# Patient Record
Sex: MALE | Race: WHITE | NOT HISPANIC OR LATINO | Employment: FULL TIME | URBAN - METROPOLITAN AREA
[De-identification: names, ages, dates, MRNs, and addresses within clinical notes are randomized per-mention and may not be internally consistent; named-entity substitution may affect disease eponyms.]

---

## 2020-06-19 ENCOUNTER — RECORDS - HEALTHEAST (OUTPATIENT)
Dept: LAB | Facility: CLINIC | Age: 35
End: 2020-06-19

## 2020-06-22 LAB — HBV SURFACE AB SERPL IA-ACNC: POSITIVE M[IU]/ML

## 2020-06-23 LAB
MEV IGG SER IA-ACNC: POSITIVE
MUV IGG SER QL IA: POSITIVE
RUBV IGG SERPL QL IA: POSITIVE
VZV IGG SER QL IA: POSITIVE

## 2020-06-24 LAB
GAMMA INTERFERON BACKGROUND BLD IA-ACNC: 0.04 IU/ML
M TB IFN-G BLD-IMP: NEGATIVE
MITOGEN IGNF BCKGRD COR BLD-ACNC: -0.01 IU/ML
MITOGEN IGNF BCKGRD COR BLD-ACNC: 0.01 IU/ML
QTF INTERPRETATION: NORMAL
QTF MITOGEN - NIL: 8.75 IU/ML

## 2021-10-28 ENCOUNTER — HOSPITAL ENCOUNTER (EMERGENCY)
Facility: CLINIC | Age: 36
Discharge: HOME | End: 2021-10-28
Attending: EMERGENCY MEDICINE | Admitting: EMERGENCY MEDICINE
Payer: COMMERCIAL

## 2021-10-28 VITALS
WEIGHT: 190 LBS | SYSTOLIC BLOOD PRESSURE: 138 MMHG | HEIGHT: 72 IN | DIASTOLIC BLOOD PRESSURE: 65 MMHG | OXYGEN SATURATION: 98 % | HEART RATE: 81 BPM | RESPIRATION RATE: 22 BRPM | BODY MASS INDEX: 25.73 KG/M2 | TEMPERATURE: 98.8 F

## 2021-10-28 DIAGNOSIS — F10.930 ALCOHOL WITHDRAWAL SYNDROME WITHOUT COMPLICATION (H): ICD-10-CM

## 2021-10-28 LAB
ALBUMIN SERPL-MCNC: 4.7 G/DL (ref 3.5–5)
ALP SERPL-CCNC: 92 U/L (ref 45–120)
ALT SERPL W P-5'-P-CCNC: 26 U/L (ref 0–45)
ANION GAP SERPL CALCULATED.3IONS-SCNC: 24 MMOL/L (ref 5–18)
AST SERPL W P-5'-P-CCNC: 26 U/L (ref 0–40)
ATRIAL RATE - MUSE: 70 BPM
BASOPHILS # BLD AUTO: 0 10E3/UL (ref 0–0.2)
BASOPHILS NFR BLD AUTO: 0 %
BILIRUB SERPL-MCNC: 1.2 MG/DL (ref 0–1)
BUN SERPL-MCNC: 10 MG/DL (ref 8–22)
CALCIUM SERPL-MCNC: 9.8 MG/DL (ref 8.5–10.5)
CHLORIDE BLD-SCNC: 98 MMOL/L (ref 98–107)
CO2 SERPL-SCNC: 19 MMOL/L (ref 22–31)
CREAT SERPL-MCNC: 0.76 MG/DL (ref 0.7–1.3)
DIASTOLIC BLOOD PRESSURE - MUSE: NORMAL MMHG
EOSINOPHIL # BLD AUTO: 0 10E3/UL (ref 0–0.7)
EOSINOPHIL NFR BLD AUTO: 0 %
ERYTHROCYTE [DISTWIDTH] IN BLOOD BY AUTOMATED COUNT: 13.7 % (ref 10–15)
ETHANOL SERPL-MCNC: 34 MG/DL
GFR SERPL CREATININE-BSD FRML MDRD: >90 ML/MIN/1.73M2
GLUCOSE BLD-MCNC: 128 MG/DL (ref 70–125)
HCT VFR BLD AUTO: 40.5 % (ref 40–53)
HGB BLD-MCNC: 14 G/DL (ref 13.3–17.7)
IMM GRANULOCYTES # BLD: 0 10E3/UL
IMM GRANULOCYTES NFR BLD: 0 %
INTERPRETATION ECG - MUSE: NORMAL
LIPASE SERPL-CCNC: 23 U/L (ref 0–52)
LYMPHOCYTES # BLD AUTO: 0.8 10E3/UL (ref 0.8–5.3)
LYMPHOCYTES NFR BLD AUTO: 8 %
MAGNESIUM SERPL-MCNC: 1.4 MG/DL (ref 1.8–2.6)
MCH RBC QN AUTO: 26.8 PG (ref 26.5–33)
MCHC RBC AUTO-ENTMCNC: 34.6 G/DL (ref 31.5–36.5)
MCV RBC AUTO: 78 FL (ref 78–100)
MONOCYTES # BLD AUTO: 0.4 10E3/UL (ref 0–1.3)
MONOCYTES NFR BLD AUTO: 4 %
NEUTROPHILS # BLD AUTO: 8.4 10E3/UL (ref 1.6–8.3)
NEUTROPHILS NFR BLD AUTO: 88 %
NRBC # BLD AUTO: 0 10E3/UL
NRBC BLD AUTO-RTO: 0 /100
P AXIS - MUSE: 71 DEGREES
PLATELET # BLD AUTO: 112 10E3/UL (ref 150–450)
POTASSIUM BLD-SCNC: 3.6 MMOL/L (ref 3.5–5)
PR INTERVAL - MUSE: 156 MS
PROT SERPL-MCNC: 7.8 G/DL (ref 6–8)
QRS DURATION - MUSE: 100 MS
QT - MUSE: 412 MS
QTC - MUSE: 444 MS
R AXIS - MUSE: 83 DEGREES
RBC # BLD AUTO: 5.22 10E6/UL (ref 4.4–5.9)
SODIUM SERPL-SCNC: 141 MMOL/L (ref 136–145)
SYSTOLIC BLOOD PRESSURE - MUSE: NORMAL MMHG
T AXIS - MUSE: 66 DEGREES
VENTRICULAR RATE- MUSE: 70 BPM
WBC # BLD AUTO: 9.6 10E3/UL (ref 4–11)

## 2021-10-28 PROCEDURE — 96361 HYDRATE IV INFUSION ADD-ON: CPT

## 2021-10-28 PROCEDURE — 250N000009 HC RX 250: Performed by: EMERGENCY MEDICINE

## 2021-10-28 PROCEDURE — 96376 TX/PRO/DX INJ SAME DRUG ADON: CPT

## 2021-10-28 PROCEDURE — 96375 TX/PRO/DX INJ NEW DRUG ADDON: CPT

## 2021-10-28 PROCEDURE — 258N000003 HC RX IP 258 OP 636: Performed by: EMERGENCY MEDICINE

## 2021-10-28 PROCEDURE — 82077 ASSAY SPEC XCP UR&BREATH IA: CPT | Performed by: EMERGENCY MEDICINE

## 2021-10-28 PROCEDURE — 93005 ELECTROCARDIOGRAM TRACING: CPT | Performed by: EMERGENCY MEDICINE

## 2021-10-28 PROCEDURE — 85025 COMPLETE CBC W/AUTO DIFF WBC: CPT | Performed by: EMERGENCY MEDICINE

## 2021-10-28 PROCEDURE — 36415 COLL VENOUS BLD VENIPUNCTURE: CPT | Performed by: EMERGENCY MEDICINE

## 2021-10-28 PROCEDURE — 83735 ASSAY OF MAGNESIUM: CPT | Performed by: EMERGENCY MEDICINE

## 2021-10-28 PROCEDURE — 96365 THER/PROPH/DIAG IV INF INIT: CPT

## 2021-10-28 PROCEDURE — 99285 EMERGENCY DEPT VISIT HI MDM: CPT | Mod: 25

## 2021-10-28 PROCEDURE — 250N000011 HC RX IP 250 OP 636: Performed by: EMERGENCY MEDICINE

## 2021-10-28 PROCEDURE — 82040 ASSAY OF SERUM ALBUMIN: CPT | Performed by: EMERGENCY MEDICINE

## 2021-10-28 PROCEDURE — 250N000013 HC RX MED GY IP 250 OP 250 PS 637: Performed by: EMERGENCY MEDICINE

## 2021-10-28 PROCEDURE — 83690 ASSAY OF LIPASE: CPT | Performed by: EMERGENCY MEDICINE

## 2021-10-28 RX ORDER — DIAZEPAM 10 MG/2ML
10 INJECTION, SOLUTION INTRAMUSCULAR; INTRAVENOUS ONCE
Status: COMPLETED | OUTPATIENT
Start: 2021-10-28 | End: 2021-10-28

## 2021-10-28 RX ORDER — DIAZEPAM 10 MG/2ML
5 INJECTION, SOLUTION INTRAMUSCULAR; INTRAVENOUS ONCE
Status: COMPLETED | OUTPATIENT
Start: 2021-10-28 | End: 2021-10-28

## 2021-10-28 RX ORDER — MULTIPLE VITAMINS W/ MINERALS TAB 9MG-400MCG
1 TAB ORAL ONCE
Status: COMPLETED | OUTPATIENT
Start: 2021-10-28 | End: 2021-10-28

## 2021-10-28 RX ORDER — SODIUM CHLORIDE 9 MG/ML
INJECTION, SOLUTION INTRAVENOUS CONTINUOUS
Status: DISCONTINUED | OUTPATIENT
Start: 2021-10-28 | End: 2021-10-28 | Stop reason: HOSPADM

## 2021-10-28 RX ORDER — FOLIC ACID 1 MG/1
1 TABLET ORAL ONCE
Status: COMPLETED | OUTPATIENT
Start: 2021-10-28 | End: 2021-10-28

## 2021-10-28 RX ORDER — ONDANSETRON 2 MG/ML
4 INJECTION INTRAMUSCULAR; INTRAVENOUS ONCE
Status: COMPLETED | OUTPATIENT
Start: 2021-10-28 | End: 2021-10-28

## 2021-10-28 RX ORDER — MAGNESIUM SULFATE HEPTAHYDRATE 40 MG/ML
2 INJECTION, SOLUTION INTRAVENOUS ONCE
Status: COMPLETED | OUTPATIENT
Start: 2021-10-28 | End: 2021-10-28

## 2021-10-28 RX ORDER — LORAZEPAM 1 MG/1
TABLET ORAL
Qty: 16 TABLET | Refills: 0 | Status: SHIPPED | OUTPATIENT
Start: 2021-10-28

## 2021-10-28 RX ADMIN — MULTIPLE VITAMINS W/ MINERALS TAB 1 TABLET: TAB at 10:46

## 2021-10-28 RX ADMIN — Medication 50 MG: at 10:46

## 2021-10-28 RX ADMIN — ONDANSETRON 4 MG: 2 INJECTION INTRAMUSCULAR; INTRAVENOUS at 11:19

## 2021-10-28 RX ADMIN — FAMOTIDINE 20 MG: 10 INJECTION, SOLUTION INTRAVENOUS at 10:42

## 2021-10-28 RX ADMIN — SODIUM CHLORIDE 1000 ML: 9 INJECTION, SOLUTION INTRAVENOUS at 11:01

## 2021-10-28 RX ADMIN — FOLIC ACID 1 MG: 1 TABLET ORAL at 10:40

## 2021-10-28 RX ADMIN — MAGNESIUM SULFATE HEPTAHYDRATE 2 G: 40 INJECTION, SOLUTION INTRAVENOUS at 11:22

## 2021-10-28 RX ADMIN — DIAZEPAM 10 MG: 5 INJECTION, SOLUTION INTRAMUSCULAR; INTRAVENOUS at 10:45

## 2021-10-28 RX ADMIN — DIAZEPAM 5 MG: 5 INJECTION, SOLUTION INTRAMUSCULAR; INTRAVENOUS at 11:52

## 2021-10-28 ASSESSMENT — ENCOUNTER SYMPTOMS
SEIZURES: 0
TREMORS: 1

## 2021-10-28 ASSESSMENT — MIFFLIN-ST. JEOR: SCORE: 1834.83

## 2021-10-28 NOTE — DISCHARGE INSTRUCTIONS
Medication as prescribed for management of your withdrawal recommend follow-up on an outpatient basis with alcohol treatment program.  Return to emergency department if escalation to your symptoms or develop additional concern.

## 2021-10-28 NOTE — ED PROVIDER NOTES
EMERGENCY DEPARTMENT ENCOUNTER      NAME: Prem Dick  AGE: 36 year old male  YOB: 1985  MRN: [id number]  EVALUATION DATE & TIME: 10/28/2021  9:52 AM    PCP: [facility]    ED PROVIDER: FLAQUITA Wood    Chief Complaint   Patient presents with     Alcohol Intoxication     FINAL IMPRESSION:  1. Alcohol withdrawal syndrome without complication (H)      ED COURSE & MEDICAL DECISION MAKING:    Pertinent Labs & Imaging studies reviewed. (See chart for details)  36 year old male presents to the Emergency Department for evaluation of alcohol withdrawal symptoms.  Patient reports he is 24 hours post his last drink.  History of EtOH abuse.  History of previous failed outpatient treatment programs.  Reports 1.5 L of whiskey typically when he is on a mercado.  Patient reporting tremor and general malaise.  No previous history of seizures.  On clinical examination patient was noted to be mildly hypertensive.  Normal heart rate.  He was quite tremulous.  Clinical examination otherwise unremarkable.  I recommended institution of IV fluids and benzodiazepines for management of acute alcohol withdrawal.  Electrolyte profile pending.  Patient received multivitamin thiamine folic acid.  Plan of care for repeat assessment after administration of medications for treatment of his withdrawal symptom and screen laboratory testing.    10:07 AM I met with the patient for the initial interview and physical examination. Discussed plan for treatment and workup in the ED. PPE: Provider wore surgical mask and gloves.   11:31 AM I rechecked and updated the patient.     12:22 PM  Patient responding very well to treatment in the emergency department.  Vital signs now normalized.  Mild residual tremor but for the most part markedly improved subjectively reporting significant improvement.  Some mild electrolyte disturbances that were replaced.  I think patient is okay for discharge home is declining transfer to  detox.  We will discharge the patient with a tapering prescription of benzodiazepine.  His mother is going to be assisting with his recovery and management of the medications.  Recommended outpatient follow-up for alcohol addiction.  Reviewed return precaution prior to discharge.    Plan and all results were discussed  Time was taken to answer all questions. Patient and/or associated parties expressed understanding and were agreeable to the treatment plan.  Warning signs and ER return precautions provided. Discharged with discharge instructions outlining plan for further care and follow up.     MEDICATIONS GIVEN IN THE EMERGENCY:  New Prescriptions    LORAZEPAM (ATIVAN) 1 MG TABLET    2 mg orally every 6 hours for 4 doses, then 1 mg every 6 hours for 8 doses (guideline dosage) for alcohol withdrawal       NEW PRESCRIPTIONS STARTED AT TODAY'S ER VISIT  Patient's Medications   New Prescriptions    LORAZEPAM (ATIVAN) 1 MG TABLET    2 mg orally every 6 hours for 4 doses, then 1 mg every 6 hours for 8 doses (guideline dosage) for alcohol withdrawal   Previous Medications    CHLORDIAZEPOXIDE (LIBRIUM) 25 MG CAPSULE    [CHLORDIAZEPOXIDE (LIBRIUM) 25 MG CAPSULE] Take 2 capsules (50 mg total) by mouth 3 (three) times a day for 3 days.    ONDANSETRON (ZOFRAN ODT) 4 MG DISINTEGRATING TABLET    [ONDANSETRON (ZOFRAN ODT) 4 MG DISINTEGRATING TABLET] Take 1 tablet (4 mg total) by mouth every 8 (eight) hours as needed for nausea.   Modified Medications    No medications on file   Discontinued Medications    No medications on file          =================================================================  HPI  Patient information was obtained from: Patient   Use of Intrepreter: N/A     Prem Dick is a 36 year old male with a pertinent medical history of anxiety, depression, and alcohol use disorder who presents to this ED by ambulance for evaluation of alcohol withdrawal symptoms.    Patient reports that he is experiencing  alcohol withdrawal symptoms. He currently endorses feeling shaky and he has burning to his central chest that feels consistent with heartburn. He has a history of withdrawal and has been through treatment programs in the past, though not recently. He denies history of withdrawal seizures. Per triage note, patient reports going on a 3-day binge of drinking. Last drink was ~24 hours ago.      REVIEW OF SYSTEMS   Review of Systems   Cardiovascular: Positive for chest pain (feels consistent with heartburn).   Neurological: Positive for tremors. Negative for seizures.   All other systems reviewed and are negative.     PAST MEDICAL HISTORY:  EtOH abuse  EtOH withdrawal    ALLERGIES:  No Known Allergies    FAMILY HISTORY:  History reviewed. No pertinent family history.    SOCIAL HISTORY:   Patient reporting that he drinks 1.5 L of whiskey daily last drink 24 hours ago  Denies drug abuse    VITALS:  Patient Vitals for the past 24 hrs:   BP Temp Temp src Pulse Resp SpO2 Height Weight   10/28/21 1145 -- -- -- 79 21 97 % -- --   10/28/21 1130 139/71 -- -- 85 -- 98 % -- --   10/28/21 1115 136/76 -- -- -- -- -- -- --   10/28/21 1108 -- -- -- 80 23 96 % -- --   10/28/21 0956 (!) 153/90 98.8  F (37.1  C) Oral 85 22 97 % 1.829 m (6') 86.2 kg (190 lb)     PHYSICAL EXAM   Constitutional:  Well developed, Well nourished, adult male appears to be in acute distress  HENT:  Normocephalic, Atraumatic, Bilateral external ears normal, Oropharynx moist Nose normal.   Neck: Normal range of motion   Eyes: Conjunctiva normal, No discharge.   Respiratory:  Normal breath sounds, No respiratory distress, No wheezing.  No cough.  Cardiovascular:  Normal heart rate, Normal rhythm. No murmur appreciated.  Chest wall non-tender.  GI:  Soft, No tenderness, No masses, No flank tenderness.  No rebound or guarding.  : No CVA tenderness  Musculoskeletal: Full ROM.  No edema appreciated.  No cyanosis. Good ROM of major joints.  Integument:  Warm, Dry, No  erythema, No rash.    Neurologic:  Alert & oriented.    Noted to be tremulous but no focal deficits appreciated.  Psychiatric:   anxious appearing adult male    LAB:  All pertinent labs reviewed and interpreted.  Results for orders placed or performed during the hospital encounter of 10/28/21   Comprehensive metabolic panel   Result Value Ref Range    Sodium 141 136 - 145 mmol/L    Potassium 3.6 3.5 - 5.0 mmol/L    Chloride 98 98 - 107 mmol/L    Carbon Dioxide (CO2) 19 (L) 22 - 31 mmol/L    Anion Gap 24 (H) 5 - 18 mmol/L    Urea Nitrogen 10 8 - 22 mg/dL    Creatinine 0.76 0.70 - 1.30 mg/dL    Calcium 9.8 8.5 - 10.5 mg/dL    Glucose 128 (H) 70 - 125 mg/dL    Alkaline Phosphatase 92 45 - 120 U/L    AST 26 0 - 40 U/L    ALT 26 0 - 45 U/L    Protein Total 7.8 6.0 - 8.0 g/dL    Albumin 4.7 3.5 - 5.0 g/dL    Bilirubin Total 1.2 (H) 0.0 - 1.0 mg/dL    GFR Estimate >90 >60 mL/min/1.73m2   Result Value Ref Range    Lipase 23 0 - 52 U/L   Result Value Ref Range    Magnesium 1.4 (L) 1.8 - 2.6 mg/dL   Ethyl Alcohol Level   Result Value Ref Range    Alcohol, Blood 34 (H) None detected mg/dL   CBC with platelets and differential   Result Value Ref Range    WBC Count 9.6 4.0 - 11.0 10e3/uL    RBC Count 5.22 4.40 - 5.90 10e6/uL    Hemoglobin 14.0 13.3 - 17.7 g/dL    Hematocrit 40.5 40.0 - 53.0 %    MCV 78 78 - 100 fL    MCH 26.8 26.5 - 33.0 pg    MCHC 34.6 31.5 - 36.5 g/dL    RDW 13.7 10.0 - 15.0 %    Platelet Count 112 (L) 150 - 450 10e3/uL    % Neutrophils 88 %    % Lymphocytes 8 %    % Monocytes 4 %    % Eosinophils 0 %    % Basophils 0 %    % Immature Granulocytes 0 %    NRBCs per 100 WBC 0 <1 /100    Absolute Neutrophils 8.4 (H) 1.6 - 8.3 10e3/uL    Absolute Lymphocytes 0.8 0.8 - 5.3 10e3/uL    Absolute Monocytes 0.4 0.0 - 1.3 10e3/uL    Absolute Eosinophils 0.0 0.0 - 0.7 10e3/uL    Absolute Basophils 0.0 0.0 - 0.2 10e3/uL    Absolute Immature Granulocytes 0.0 <=0.0 10e3/uL    Absolute NRBCs 0.0 10e3/uL   ECG 12-LEAD WITH MUSE  (E)   Result Value Ref Range    Systolic Blood Pressure  mmHg    Diastolic Blood Pressure  mmHg    Ventricular Rate 70 BPM    Atrial Rate 70 BPM    AZ Interval 156 ms    QRS Duration 100 ms     ms    QTc 444 ms    P Axis 71 degrees    R AXIS 83 degrees    T Axis 66 degrees    Interpretation ECG       Sinus rhythm with sinus arrhythmia  Normal ECG  When compared with ECG of 08-JAN-2021 08:43,  No significant change was found  Confirmed by SEE ED PROVIDER NOTE FOR, ECG INTERPRETATION ([id number]),  MASOUD GUERRERO ([id number]) on 10/28/2021 10:52:38 AM         RADIOLOGY:  Reviewed all pertinent imaging. Please see official radiology report.  No orders to display       EKG:    Performed at: 10:39    Impression: Sinus rhythm with sinus arrhythmia. Normal EKG.     Rate: 70 bpm  Rhythm: sinus rhythm with sinus arrhythmia  Axis: 83  AZ Interval: 156 ms  QRS Interval: 100 ms  QTc Interval: 444 ms  ST Changes: none   Comparison: No significant change from EKG of 01/08/2021    I have independently reviewed and interpreted the EKG(s) documented above.        I, Alexa Alvarez, am serving as a scribe to document services personally performed by Dr. Wood based on my observation and the provider's statements to me. I, Inder Wood MD attest that Alexa Alvarez is acting in a scribe capacity, has observed my performance of the services and has documented them in accordance with my direction.    Inder Wood M.D.  Emergency Medicine  [facility] EMERGENCY ROOM  [address] [address]  [phone]  Dept: [phone]     Inder Wood MD  10/28/21 1223

## 2021-10-28 NOTE — ED TRIAGE NOTES
"Pt presents via EMS with C/O of alcohol withdrawal symptoms.   Patient normally drinks 0.5L of whiskey.   Patient's states \"I binged for three days, however I have not had a drink in over 24 hours\"  Patient appears diaphoretic and positive for nausea, tremors, and weakness.   Patient hypertensive per EMS   Denies falls   Denies alcohol seizures  "

## 2022-04-02 ENCOUNTER — HEALTH MAINTENANCE LETTER (OUTPATIENT)
Age: 37
End: 2022-04-02

## 2022-10-01 ENCOUNTER — HEALTH MAINTENANCE LETTER (OUTPATIENT)
Age: 37
End: 2022-10-01

## 2023-05-14 ENCOUNTER — HEALTH MAINTENANCE LETTER (OUTPATIENT)
Age: 38
End: 2023-05-14

## 2024-01-29 ENCOUNTER — HOSPITAL ENCOUNTER (EMERGENCY)
Facility: CLINIC | Age: 39
Discharge: HOME | End: 2024-01-29
Attending: EMERGENCY MEDICINE | Admitting: EMERGENCY MEDICINE
Payer: COMMERCIAL

## 2024-01-29 ENCOUNTER — APPOINTMENT (OUTPATIENT)
Dept: GENERAL RADIOLOGY | Facility: CLINIC | Age: 39
End: 2024-01-29
Attending: EMERGENCY MEDICINE
Payer: COMMERCIAL

## 2024-01-29 VITALS
SYSTOLIC BLOOD PRESSURE: 127 MMHG | TEMPERATURE: 98.1 F | DIASTOLIC BLOOD PRESSURE: 72 MMHG | OXYGEN SATURATION: 96 % | HEART RATE: 59 BPM | RESPIRATION RATE: 18 BRPM

## 2024-01-29 DIAGNOSIS — R42 LIGHTHEADEDNESS: ICD-10-CM

## 2024-01-29 LAB
ANION GAP SERPL CALCULATED.3IONS-SCNC: 9 MMOL/L (ref 7–15)
BASOPHILS # BLD AUTO: 0 10E3/UL (ref 0–0.2)
BASOPHILS NFR BLD AUTO: 1 %
BUN SERPL-MCNC: 10.8 MG/DL (ref 6–20)
CALCIUM SERPL-MCNC: 9.4 MG/DL (ref 8.6–10)
CHLORIDE SERPL-SCNC: 99 MMOL/L (ref 98–107)
CREAT SERPL-MCNC: 0.88 MG/DL (ref 0.67–1.17)
DEPRECATED HCO3 PLAS-SCNC: 27 MMOL/L (ref 22–29)
EGFRCR SERPLBLD CKD-EPI 2021: >90 ML/MIN/1.73M2
EOSINOPHIL # BLD AUTO: 0.2 10E3/UL (ref 0–0.7)
EOSINOPHIL NFR BLD AUTO: 3 %
ERYTHROCYTE [DISTWIDTH] IN BLOOD BY AUTOMATED COUNT: 12.8 % (ref 10–15)
GLUCOSE BLDC GLUCOMTR-MCNC: 123 MG/DL (ref 70–99)
GLUCOSE SERPL-MCNC: 114 MG/DL (ref 70–99)
HCT VFR BLD AUTO: 40.4 % (ref 40–53)
HGB BLD-MCNC: 14.3 G/DL (ref 13.3–17.7)
IMM GRANULOCYTES # BLD: 0 10E3/UL
IMM GRANULOCYTES NFR BLD: 0 %
LYMPHOCYTES # BLD AUTO: 1.4 10E3/UL (ref 0.8–5.3)
LYMPHOCYTES NFR BLD AUTO: 27 %
MCH RBC QN AUTO: 28.3 PG (ref 26.5–33)
MCHC RBC AUTO-ENTMCNC: 35.4 G/DL (ref 31.5–36.5)
MCV RBC AUTO: 80 FL (ref 78–100)
MONOCYTES # BLD AUTO: 0.5 10E3/UL (ref 0–1.3)
MONOCYTES NFR BLD AUTO: 10 %
NEUTROPHILS # BLD AUTO: 3 10E3/UL (ref 1.6–8.3)
NEUTROPHILS NFR BLD AUTO: 59 %
NRBC # BLD AUTO: 0 10E3/UL
NRBC BLD AUTO-RTO: 0 /100
PLATELET # BLD AUTO: 166 10E3/UL (ref 150–450)
POTASSIUM SERPL-SCNC: 4.2 MMOL/L (ref 3.4–5.3)
RBC # BLD AUTO: 5.05 10E6/UL (ref 4.4–5.9)
SODIUM SERPL-SCNC: 135 MMOL/L (ref 135–145)
TROPONIN T SERPL HS-MCNC: <6 NG/L
TROPONIN T SERPL HS-MCNC: <6 NG/L
WBC # BLD AUTO: 5 10E3/UL (ref 4–11)

## 2024-01-29 PROCEDURE — 36415 COLL VENOUS BLD VENIPUNCTURE: CPT | Performed by: EMERGENCY MEDICINE

## 2024-01-29 PROCEDURE — 258N000003 HC RX IP 258 OP 636: Performed by: EMERGENCY MEDICINE

## 2024-01-29 PROCEDURE — 84484 ASSAY OF TROPONIN QUANT: CPT | Performed by: EMERGENCY MEDICINE

## 2024-01-29 PROCEDURE — 85025 COMPLETE CBC W/AUTO DIFF WBC: CPT | Performed by: EMERGENCY MEDICINE

## 2024-01-29 PROCEDURE — 96374 THER/PROPH/DIAG INJ IV PUSH: CPT

## 2024-01-29 PROCEDURE — 93005 ELECTROCARDIOGRAM TRACING: CPT

## 2024-01-29 PROCEDURE — 82962 GLUCOSE BLOOD TEST: CPT

## 2024-01-29 PROCEDURE — 250N000011 HC RX IP 250 OP 636: Performed by: EMERGENCY MEDICINE

## 2024-01-29 PROCEDURE — 71046 X-RAY EXAM CHEST 2 VIEWS: CPT

## 2024-01-29 PROCEDURE — 96361 HYDRATE IV INFUSION ADD-ON: CPT

## 2024-01-29 PROCEDURE — 80048 BASIC METABOLIC PNL TOTAL CA: CPT | Performed by: EMERGENCY MEDICINE

## 2024-01-29 PROCEDURE — 99285 EMERGENCY DEPT VISIT HI MDM: CPT | Mod: 25

## 2024-01-29 RX ORDER — ONDANSETRON 2 MG/ML
4 INJECTION INTRAMUSCULAR; INTRAVENOUS ONCE
Status: COMPLETED | OUTPATIENT
Start: 2024-01-29 | End: 2024-01-29

## 2024-01-29 RX ADMIN — ONDANSETRON 4 MG: 2 INJECTION INTRAMUSCULAR; INTRAVENOUS at 09:02

## 2024-01-29 RX ADMIN — SODIUM CHLORIDE 1000 ML: 9 INJECTION, SOLUTION INTRAVENOUS at 08:36

## 2024-01-29 ASSESSMENT — ACTIVITIES OF DAILY LIVING (ADL)
ADLS_ACUITY_SCORE: 35
ADLS_ACUITY_SCORE: 35

## 2024-01-29 NOTE — DISCHARGE INSTRUCTIONS
Your EKG, chest x-ray, blood work all appear normal.  It is reassuring that you are feeling better after receiving IV fluids.  We recommend that you rest and drink lots of fluids.  We also recommend that you make an appoint with your primary care provider.  Please return the emergency department if you develop any new or concerning symptoms.

## 2024-01-29 NOTE — ED NOTES
"Pt road tested with steady gait and mild dizziness. Pt also states his head feels \"foggy and just not right.\"  "

## 2024-01-29 NOTE — ED TRIAGE NOTES
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 Pt stood up from grabbing something off floor and felt dizzy,light headed and seeing black. Pt states once sitting down the dizziness continued. Pt feels sweaty and clammy and pale. Pt A/Ox4, VSS, ABC's intact.      Triage Assessment (Adult)       Row Name 01/29/24 0819          Triage Assessment    Airway WDL WDL        Respiratory WDL    Respiratory WDL WDL        Skin Circulation/Temperature WDL    Skin Circulation/Temperature WDL WDL        Cardiac WDL    Cardiac WDL WDL        Peripheral/Neurovascular WDL    Peripheral Neurovascular WDL WDL        Cognitive/Neuro/Behavioral WDL    Cognitive/Neuro/Behavioral WDL WDL

## 2024-01-30 LAB
ATRIAL RATE - MUSE: 73 BPM
DIASTOLIC BLOOD PRESSURE - MUSE: NORMAL MMHG
INTERPRETATION ECG - MUSE: NORMAL
P AXIS - MUSE: 74 DEGREES
PR INTERVAL - MUSE: 158 MS
QRS DURATION - MUSE: 102 MS
QT - MUSE: 366 MS
QTC - MUSE: 403 MS
R AXIS - MUSE: 85 DEGREES
SYSTOLIC BLOOD PRESSURE - MUSE: NORMAL MMHG
T AXIS - MUSE: 52 DEGREES
VENTRICULAR RATE- MUSE: 73 BPM

## 2024-07-21 ENCOUNTER — HEALTH MAINTENANCE LETTER (OUTPATIENT)
Age: 39
End: 2024-07-21

## 2025-08-10 ENCOUNTER — HEALTH MAINTENANCE LETTER (OUTPATIENT)
Age: 40
End: 2025-08-10